# Patient Record
Sex: FEMALE | Race: WHITE | NOT HISPANIC OR LATINO | Employment: STUDENT | URBAN - METROPOLITAN AREA
[De-identification: names, ages, dates, MRNs, and addresses within clinical notes are randomized per-mention and may not be internally consistent; named-entity substitution may affect disease eponyms.]

---

## 2018-05-22 ENCOUNTER — PATIENT MESSAGE (OUTPATIENT)
Dept: DERMATOLOGY | Facility: CLINIC | Age: 28
End: 2018-05-22

## 2018-05-22 ENCOUNTER — OFFICE VISIT (OUTPATIENT)
Dept: DERMATOLOGY | Facility: CLINIC | Age: 28
End: 2018-05-22
Payer: COMMERCIAL

## 2018-05-22 DIAGNOSIS — I78.1 TELANGIECTASIA: ICD-10-CM

## 2018-05-22 DIAGNOSIS — D22.5 MULTIPLE BENIGN MELANOCYTIC NEVI OF UPPER EXTREMITY, LOWER EXTREMITY, AND TRUNK: ICD-10-CM

## 2018-05-22 DIAGNOSIS — L70.0 ACNE VULGARIS: Primary | ICD-10-CM

## 2018-05-22 DIAGNOSIS — D22.60 MULTIPLE BENIGN MELANOCYTIC NEVI OF UPPER EXTREMITY, LOWER EXTREMITY, AND TRUNK: ICD-10-CM

## 2018-05-22 DIAGNOSIS — L53.9: ICD-10-CM

## 2018-05-22 DIAGNOSIS — D22.70 MULTIPLE BENIGN MELANOCYTIC NEVI OF UPPER EXTREMITY, LOWER EXTREMITY, AND TRUNK: ICD-10-CM

## 2018-05-22 DIAGNOSIS — D22.30 MELANOCYTIC NEVI OF FACE: ICD-10-CM

## 2018-05-22 PROCEDURE — 99203 OFFICE O/P NEW LOW 30 MIN: CPT | Mod: S$GLB,,, | Performed by: DERMATOLOGY

## 2018-05-22 RX ORDER — CLINDAMYCIN PHOSPHATE 10 MG/G
GEL TOPICAL
Qty: 60 G | Refills: 3 | Status: SHIPPED | OUTPATIENT
Start: 2018-05-22 | End: 2018-05-23 | Stop reason: SDUPTHER

## 2018-05-22 NOTE — PROGRESS NOTES
Subjective:       Patient ID:  Ramez Franklin is a 27 y.o. female who presents for   Chief Complaint   Patient presents with    Skin Check     TBSE    Acne     nexaplanon, deep acne, OTC sal acid    Skin Discoloration     chest, red, 15 years     Pt is here today for TBSE with a couple of complaints. Pt complains of acne to her face. Pt states that's he has had the acne for about 6 months. Pt states that her face is oily. Skin is sensitive. Pt complain of redness to her chest. Pt states that her chest has been red for 15 years, no prevTX.       Acne  - Initial  Affected locations: face  Duration: 6 months  Signs / symptoms: spreading and swelling  Severity: mild  Timing: constant  Aggravated by: nothing  Treatments tried: OTC sal acid.  Improvement on treatment: mild    Skin Discoloration  - Initial  Affected locations: chest  Duration: 15 years  Signs / symptoms: redness  Severity: mild  Timing: constant  Aggravated by: pressure and sweating (morning)  Relieving factors/Treatments tried: nothing  Improvement on treatment: no relief      Review of Systems   Skin: Positive for daily sunscreen use, activity-related sunscreen use and wears hat. Negative for recent sunburn.   Hematologic/Lymphatic: Does not bruise/bleed easily.        Objective:    Physical Exam   Constitutional: She appears well-developed and well-nourished. No distress.   Neurological: She is alert and oriented to person, place, and time. She is not disoriented.   Psychiatric: She has a normal mood and affect.   Skin:   Areas Examined (abnormalities noted in diagram):   Scalp / Hair Palpated and Inspected  Head / Face Inspection Performed  Neck Inspection Performed  Chest / Axilla Inspection Performed  Abdomen Inspection Performed  Genitals / Buttocks / Groin Inspection Performed  Back Inspection Performed  RUE Inspected  LUE Inspection Performed  RLE Inspected  LLE Inspection Performed  Nails and Digits Inspection Performed                    Diagram Legend     Erythematous scaling macule/papule c/w actinic keratosis       Vascular papule c/w angioma      Pigmented verrucoid papule/plaque c/w seborrheic keratosis      Yellow umbilicated papule c/w sebaceous hyperplasia      Irregularly shaped tan macule c/w lentigo     1-2 mm smooth white papules consistent with Milia      Movable subcutaneous cyst with punctum c/w epidermal inclusion cyst      Subcutaneous movable cyst c/w pilar cyst      Firm pink to brown papule c/w dermatofibroma      Pedunculated fleshy papule(s) c/w skin tag(s)      Evenly pigmented macule c/w junctional nevus     Mildly variegated pigmented, slightly irregular-bordered macule c/w mildly atypical nevus      Flesh colored to evenly pigmented papule c/w intradermal nevus       Pink pearly papule/plaque c/w basal cell carcinoma      Erythematous hyperkeratotic cursted plaque c/w SCC      Surgical scar with no sign of skin cancer recurrence      Open and closed comedones      Inflammatory papules and pustules      Verrucoid papule consistent consistent with wart     Erythematous eczematous patches and plaques     Dystrophic onycholytic nail with subungual debris c/w onychomycosis     Umbilicated papule    Erythematous-base heme-crusted tan verrucoid plaque consistent with inflamed seborrheic keratosis     Erythematous Silvery Scaling Plaque c/w Psoriasis     See annotation      Assessment / Plan:        Acne vulgaris  -     clindamycin phosphate 1% (CLEOCIN T) 1 % gel; Apply to face BID prn acne  Dispense: 60 g; Refill: 3  Recommended a pea-sized amount of Differin gel to entire face qhs.  Recommended a benzoyl peroxide (2-5%) wash, such as PanOxyl 4% Creamy Wash or Neutrogena Clear Pore Cleanser/Mask. Reminded patient that benzoyl peroxide can bleach towels, sheets, and clothing if not rinsed well from the skin.    Multiple benign melanocytic nevi of face, upper extremity, lower extremity, and trunk  Multiple benign-appearing nevi  present on exam today. Reassurance provided. Counseled patient to periodically examine moles and return to clinic if any moles change or become symptomatic.    Telangiectasia + Reactive erythema  Discussed laser to help with telangiectasia, however, may or may not improve the reactive erythema.    Follow-up in about 1 year (around 5/22/2019) for TBSE, or sooner if any new problems or changing lesions.

## 2018-05-23 ENCOUNTER — TELEPHONE (OUTPATIENT)
Dept: DERMATOLOGY | Facility: CLINIC | Age: 28
End: 2018-05-23

## 2018-05-23 DIAGNOSIS — L70.0 ACNE VULGARIS: Primary | ICD-10-CM

## 2018-05-23 RX ORDER — CLINDAMYCIN PHOSPHATE 10 MG/G
GEL TOPICAL
Qty: 60 G | Refills: 3 | Status: SHIPPED | OUTPATIENT
Start: 2018-05-23

## 2018-05-23 NOTE — TELEPHONE ENCOUNTER
Pt requests that her clindamycin prescription be resent to the [facility] at [address] [facility].

## 2018-05-31 ENCOUNTER — TELEPHONE (OUTPATIENT)
Dept: DERMATOLOGY | Facility: CLINIC | Age: 28
End: 2018-05-31